# Patient Record
(demographics unavailable — no encounter records)

---

## 2024-10-10 NOTE — HISTORY OF PRESENT ILLNESS
[10] : 10 [Dull/Aching] : dull/aching [Sharp] : sharp [Tingling] : tingling [Household chores] : household chores [Social interactions] : social interactions [Meds] : meds [Ice] : ice [Heat] : heat [Nothing helps with pain getting better] : Nothing helps with pain getting better [Sitting] : sitting [Standing] : standing [Walking] : walking [Lying in bed] : lying in bed [de-identified] : 2/7/24: 53yo F with longstanding bilateral knee pain for the past 12 years with no injury. She states pain has been worsening over the past couple of weeks. She has done PT, NSAIDs, CSIs, and gel inj in the past. She went to the ER recently and had bilateral knees aspirated.   4/5/24: Here for follow up bilateral knee. Her pain is the same as last visit. She received authorization for MRIs today. There was no recent injury.   04/24/24: pt is here today for MRI results for diego knees. would like to discuss TKA today 05/29/24 - pt is here for a follow up on Diego knee. pt states she still has pain within the knees.  6/26/24: here for PO#1 bilateral knees. pt states she sill has pain and has been having throbbing within the areas, felt a pop in her right knee the other day  07/17/24 - pt is here for PO#2 bilaterial knees, pt states pain continues no changes. still has throbbing / traveling down left leg, toes. numbness present in toes.   7/31/24: pt is here today for f/u on b/l knees, pt states she still is having pain mostly at night  08/21/24: pt is here for f/u on b/l knees, stil has pain, mostly at night. she travelled to [place] for a week and her pain was calming down, she is improving overall  10/09/24: Patient is here to follow up on BOTH KNEES. Patient notes no improvement. patient has severe pain in the right knee with motion. Patient uses prescribed meds and rest to help with the pain. Pain is affecting sleep. Pt does at home PT due to pain in the back. [] : no [FreeTextEntry5] : 04/05/24 - pt is here today for EMA knees but RT knee hurts more, laying down is painful. and states medication did not help, also states she never got to do her MRI and would like a new referral  [FreeTextEntry7] : down the leg, tingling within toes for both.

## 2024-10-10 NOTE — PHYSICAL EXAM
[NL (0)] : extension 0 degrees [] : uses walker [Bilateral] : knee bilaterally [There are no fractures, subluxations or dislocations. No significant abnormalities are seen] : There are no fractures, subluxations or dislocations. No significant abnormalities are seen [No loss of surgical correlation. Bony alignment acceptable. Hardware in appropriate position] : No loss of surgical correlation. Bony alignment acceptable. Hardware in appropriate position [FreeTextEntry9] : flexion 110 on right, 100 on left

## 2024-10-10 NOTE — HISTORY OF PRESENT ILLNESS
[10] : 10 [Dull/Aching] : dull/aching [Sharp] : sharp [Tingling] : tingling [Household chores] : household chores [Social interactions] : social interactions [Meds] : meds [Ice] : ice [Heat] : heat [Nothing helps with pain getting better] : Nothing helps with pain getting better [Sitting] : sitting [Standing] : standing [Walking] : walking [Lying in bed] : lying in bed [de-identified] : 2/7/24: 53yo F with longstanding bilateral knee pain for the past 12 years with no injury. She states pain has been worsening over the past couple of weeks. She has done PT, NSAIDs, CSIs, and gel inj in the past. She went to the ER recently and had bilateral knees aspirated.   4/5/24: Here for follow up bilateral knee. Her pain is the same as last visit. She received authorization for MRIs today. There was no recent injury.   04/24/24: pt is here today for MRI results for diego knees. would like to discuss TKA today 05/29/24 - pt is here for a follow up on Diego knee. pt states she still has pain within the knees.  6/26/24: here for PO#1 bilateral knees. pt states she sill has pain and has been having throbbing within the areas, felt a pop in her right knee the other day  07/17/24 - pt is here for PO#2 bilaterial knees, pt states pain continues no changes. still has throbbing / traveling down left leg, toes. numbness present in toes.   7/31/24: pt is here today for f/u on b/l knees, pt states she still is having pain mostly at night  08/21/24: pt is here for f/u on b/l knees, stil has pain, mostly at night. she travelled to [place] for a week and her pain was calming down, she is improving overall  10/09/24: Patient is here to follow up on BOTH KNEES. Patient notes no improvement. patient has severe pain in the right knee with motion. Patient uses prescribed meds and rest to help with the pain. Pain is affecting sleep. Pt does at home PT due to pain in the back. [] : no [FreeTextEntry5] : 04/05/24 - pt is here today for EMA knees but RT knee hurts more, laying down is painful. and states medication did not help, also states she never got to do her MRI and would like a new referral  [FreeTextEntry7] : down the leg, tingling within toes for both.

## 2024-10-10 NOTE — ASSESSMENT
[FreeTextEntry1] : 55F 4mo s/p mary TKA  Continue outpt PT progress activities as tolerated f/u MRI R knee f/u esr crp cbc return after labs/imaging   We discussed the patient's progress and they were reminded of their antibiotic prophylaxis. We discussed continued physical therapy and/or a home exercise program. Questions about their knee replacement and future follow up were answered and discussed.

## 2024-10-23 NOTE — HISTORY OF PRESENT ILLNESS
[10] : 10 [Dull/Aching] : dull/aching [Sharp] : sharp [Tingling] : tingling [Household chores] : household chores [Social interactions] : social interactions [Meds] : meds [Ice] : ice [Heat] : heat [Nothing helps with pain getting better] : Nothing helps with pain getting better [Sitting] : sitting [Standing] : standing [Walking] : walking [Lying in bed] : lying in bed [de-identified] : 2/7/24: 55yo F with longstanding bilateral knee pain for the past 12 years with no injury. She states pain has been worsening over the past couple of weeks. She has done PT, NSAIDs, CSIs, and gel inj in the past. She went to the ER recently and had bilateral knees aspirated.   4/5/24: Here for follow up bilateral knee. Her pain is the same as last visit. She received authorization for MRIs today. There was no recent injury.   04/24/24: pt is here today for MRI results for diego knees. would like to discuss TKA today 05/29/24 - pt is here for a follow up on Diego knee. pt states she still has pain within the knees.  6/26/24: here for PO#1 bilateral knees. pt states she sill has pain and has been having throbbing within the areas, felt a pop in her right knee the other day  07/17/24 - pt is here for PO#2 bilaterial knees, pt states pain continues no changes. still has throbbing / traveling down left leg, toes. numbness present in toes.   7/31/24: pt is here today for f/u on b/l knees, pt states she still is having pain mostly at night  08/21/24: pt is here for f/u on b/l knees, stil has pain, mostly at night. she travelled to [place] for a week and her pain was calming down, she is improving overall  10/09/24: Patient is here to follow up on BOTH KNEES. Patient notes no improvement. patient has severe pain in the right knee with motion. Patient uses prescribed meds and rest to help with the pain. Pain is affecting sleep. Pt does at home PT due to pain in the back.  10/16/2024: pt is here today for MRI results of R knee, pt states she is doing okay, still has slight pain within the knee. pt also states she was unsure of where to have labs completed. [] : no [FreeTextEntry5] : 04/05/24 - pt is here today for EMA knees but RT knee hurts more, laying down is painful. and states medication did not help, also states she never got to do her MRI and would like a new referral  [FreeTextEntry7] : down the leg, tingling within toes for both.

## 2024-10-23 NOTE — ASSESSMENT
[FreeTextEntry1] : 55F 4mo s/p mary TKA  Continue outpt PT progress activities as tolerated MRI R knee review, patellar tendonopathy, low concern for tear she has full extension and strength f/u esr crp cbc return will call with lab results   We discussed the patient's progress and they were reminded of their antibiotic prophylaxis. We discussed continued physical therapy and/or a home exercise program. Questions about their knee replacement and future follow up were answered and discussed.

## 2024-10-23 NOTE — HISTORY OF PRESENT ILLNESS
[10] : 10 [Dull/Aching] : dull/aching [Sharp] : sharp [Tingling] : tingling [Household chores] : household chores [Social interactions] : social interactions [Meds] : meds [Ice] : ice [Heat] : heat [Nothing helps with pain getting better] : Nothing helps with pain getting better [Sitting] : sitting [Standing] : standing [Walking] : walking [Lying in bed] : lying in bed [de-identified] : 2/7/24: 55yo F with longstanding bilateral knee pain for the past 12 years with no injury. She states pain has been worsening over the past couple of weeks. She has done PT, NSAIDs, CSIs, and gel inj in the past. She went to the ER recently and had bilateral knees aspirated.   4/5/24: Here for follow up bilateral knee. Her pain is the same as last visit. She received authorization for MRIs today. There was no recent injury.   04/24/24: pt is here today for MRI results for diego knees. would like to discuss TKA today 05/29/24 - pt is here for a follow up on Diego knee. pt states she still has pain within the knees.  6/26/24: here for PO#1 bilateral knees. pt states she sill has pain and has been having throbbing within the areas, felt a pop in her right knee the other day  07/17/24 - pt is here for PO#2 bilaterial knees, pt states pain continues no changes. still has throbbing / traveling down left leg, toes. numbness present in toes.   7/31/24: pt is here today for f/u on b/l knees, pt states she still is having pain mostly at night  08/21/24: pt is here for f/u on b/l knees, stil has pain, mostly at night. she travelled to [place] for a week and her pain was calming down, she is improving overall  10/09/24: Patient is here to follow up on BOTH KNEES. Patient notes no improvement. patient has severe pain in the right knee with motion. Patient uses prescribed meds and rest to help with the pain. Pain is affecting sleep. Pt does at home PT due to pain in the back.  10/16/2024: pt is here today for MRI results of R knee, pt states she is doing okay, still has slight pain within the knee. pt also states she was unsure of where to have labs completed. [] : no [FreeTextEntry5] : 04/05/24 - pt is here today for EMA knees but RT knee hurts more, laying down is painful. and states medication did not help, also states she never got to do her MRI and would like a new referral  [FreeTextEntry7] : down the leg, tingling within toes for both.

## 2024-10-23 NOTE — ASSESSMENT
[FreeTextEntry1] : 55F 4mo s/p mray TKA  Continue outpt PT progress activities as tolerated MRI R knee review, patellar tendonopathy, low concern for tear she has full extension and strength f/u esr crp cbc return will call with lab results   We discussed the patient's progress and they were reminded of their antibiotic prophylaxis. We discussed continued physical therapy and/or a home exercise program. Questions about their knee replacement and future follow up were answered and discussed.

## 2025-04-21 NOTE — HISTORY OF PRESENT ILLNESS
[de-identified] : 04/21/2025 COLLETTE she 56 year  is here today for evaluation of b/l knees pain for the past 12 years with no injury.  She has done PT, NSAIDs, CSIs, and gel inj in the past. . Patient had TKA surgery with dr. Rodriguez 06/10/0224. Patient reports some numbness, swelling, stiffness pain. Patient notes pain radiates to b/l legs. Patient is here today for a second opinion. Patient states pain is worse when standing or WB. Had labs for infection workup which were negative. Reports pain never improved after surgery.   Had MRI of the right knee - moderate effusion with extensor tendinopathy

## 2025-04-21 NOTE — ASSESSMENT
[FreeTextEntry1] : 56 year F with bilateral TKA Dr. Rodriguez In 2024 labs are negative. MRI of the right knee with mild effusion does not have issues with jewelry however does have hyper sensitivity issues. consider bone scan in the future MRI of the left knee will contact for allergist for metal sensitivity  Time Based billin minutes was spent with the patient today taking the patient's history, conducting a physical examination, reviewing imaging studies, and  detailed discussion regarding the diagnosis and treatment plan. 1

## 2025-04-21 NOTE — IMAGING
[de-identified] : POSTOP Bilateral KNEE EXAM Edema: mild well healing surgical incision without surrounding erythema/drainage  ROM 0-90 degrees of flexion Stable to varus/valgus stress Stable to Anterior/posterior drawer test  Neurovascular exam Motor function 5/5 distal lower extremity Sensation to light touch: intact Distal pulses: 2+  XR of the R knee today demonstrate TKA in place w/o signs of interval changes from postoperative XR   [id number] Xrays of the Right Knee were taken demonstrating TKA in place w/o signs of loosening MRI - Severe extensor mechanism tendinopathy with possible partial tearing of patella tendon

## 2025-05-06 NOTE — PLAN
[FreeTextEntry1] : 1. Depression - Has hx of depression -- states she has been dealing with depression since she was a teenager. States she has been hospitalized in the past. Last saw a psychiatrist 2011ish -- feels she had a bad experience. Denies thoughts of self harm or harming others. She currently coping with getting comfort from her dog which is her therapy dog. Has good social support in her father and friends.  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 Feels it causes her to choke on food at times, gag, cough, and can be difficult to breath when lying flat recommend ENT evaluation for removal -- referral provided - ER precaution for difficulty breathing provided  3. Memory loss - Also reports memory loss -- feels this has been ongoing for a couple years; more so worse with short term memory. Reports her mother has hx of dementia -- diagnosed at age 69. Denies head injuries or trauma.  - CBC, CMP, serum B12 & folate levels ordered - Neurology Evaluation recommended -- referral provided   4. Osteoarthritis  - s/p b/l TKA - Follows with orthopedics for b/l knee osteoarthritis

## 2025-05-06 NOTE — PHYSICAL EXAM
[No Acute Distress] : no acute distress [Well-Appearing] : well-appearing [Normal Sclera/Conjunctiva] : normal sclera/conjunctiva [EOMI] : extraocular movements intact [Normal Outer Ear/Nose] : the outer ears and nose were normal in appearance [Normal Nasal Mucosa] : the nasal mucosa was normal [No Respiratory Distress] : no respiratory distress  [No Accessory Muscle Use] : no accessory muscle use [Clear to Auscultation] : lungs were clear to auscultation bilaterally [Normal Rate] : normal rate  [Regular Rhythm] : with a regular rhythm [Normal S1, S2] : normal S1 and S2 [No Edema] : there was no peripheral edema [Soft] : abdomen soft [Non Tender] : non-tender [Non-distended] : non-distended [No Masses] : no abdominal mass palpated [No HSM] : no HSM [Normal Bowel Sounds] : normal bowel sounds [No CVA Tenderness] : no CVA  tenderness [No Spinal Tenderness] : no spinal tenderness [No Joint Swelling] : no joint swelling [Grossly Normal Strength/Tone] : grossly normal strength/tone [Coordination Grossly Intact] : coordination grossly intact [Normal Gait] : normal gait [Normal Affect] : the affect was normal [Normal Insight/Judgement] : insight and judgment were intact [de-identified] : +mass of hard palate

## 2025-05-06 NOTE — PLAN
[FreeTextEntry1] : 1. Depression - Has hx of depression -- states she has been dealing with depression since she was a teenager. States she has been hospitalized in the past. Last saw a psychiatrist 2011ish -- feels she had a bad experience. Denies thoughts of self harm or harming others. She currently coping with getting comfort from her dog which is her therapy dog. Has good social support in her father and friends.  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 Feels it causes her to choke on food at times, gag, cough, and can be difficult to breath when lying flat recommend ENT evaluation for removal -- referral provided - ER precaution for difficulty breathing provided  3. Memory loss - Also reports memory loss -- feels this has been ongoing for a couple years; more so worse with short term memory. Reports her mother has hx of dementia -- diagnosed at age 69. Denies head injuries or trauma.  - CBC, CMP, serum B12 & folate levels ordered - Neurology Evaluation recommended -- referral provided   4. Osteoarthritis  - s/p b/l TKA - Follows with orthopedics for b/l knee osteoarthritis 20-Feb-2018 07:28

## 2025-05-06 NOTE — HISTORY OF PRESENT ILLNESS
[FreeTextEntry8] : The patient presents today to establish care for multiple complaints.  - Reports upper Throat lump for years -- feels it has been slowly growing. Feels it causes her to choke on food at times, gag, cough, and can be difficult to breath when lying flat.  - Also reports memory loss -- feels this has been ongoing for a couple years; more so worse with short term memory. Reports her mother has hx of dementia -- diagnosed at age 69. Denies head injuries or trauma.  - Has hx of depression -- states she has been dealing with depression since she was a teenager. States she has been hospitalized in the past. Last saw a psychiatrist 2011ish -- feels she had a bad experience. Denies thoughts of self harm or harming others. She currently coping with getting comfort from her dog which is her therapy dog. Has good social support in her father and friends.   Follows with orthopedics for b/l knee osteoarthritis

## 2025-05-06 NOTE — PHYSICAL EXAM
[No Acute Distress] : no acute distress [Well-Appearing] : well-appearing [Normal Sclera/Conjunctiva] : normal sclera/conjunctiva [EOMI] : extraocular movements intact [Normal Outer Ear/Nose] : the outer ears and nose were normal in appearance [Normal Nasal Mucosa] : the nasal mucosa was normal [No Respiratory Distress] : no respiratory distress  [No Accessory Muscle Use] : no accessory muscle use [Clear to Auscultation] : lungs were clear to auscultation bilaterally [Normal Rate] : normal rate  [Regular Rhythm] : with a regular rhythm [Normal S1, S2] : normal S1 and S2 [No Edema] : there was no peripheral edema [Soft] : abdomen soft [Non Tender] : non-tender [Non-distended] : non-distended [No Masses] : no abdominal mass palpated [No HSM] : no HSM [Normal Bowel Sounds] : normal bowel sounds [No CVA Tenderness] : no CVA  tenderness [No Spinal Tenderness] : no spinal tenderness [No Joint Swelling] : no joint swelling [Grossly Normal Strength/Tone] : grossly normal strength/tone [Coordination Grossly Intact] : coordination grossly intact [Normal Gait] : normal gait [Normal Affect] : the affect was normal [Normal Insight/Judgement] : insight and judgment were intact [de-identified] : +mass of hard palate

## 2025-05-13 NOTE — DATA REVIEWED
[de-identified] : [facility] 4/2024: CT HEAD: No acute hemorrhage, edema, or mass effect. CTA NECK: No evidence of hemodynamically significant stenosis using NASCET criteria. No evidence of arterial dissection. CTA HEAD: No evidence of major vessel occlusion.

## 2025-05-13 NOTE — ASSESSMENT
[FreeTextEntry1] : Assessment: 55yo RH AAW with chronic depression, here for 2y or so of forgetfulness, not affecting her ADL and IADL. Exam benign. CTh 4/2024 unrevealing. Motor ok, save for MSK pain in both knees. Concern for a few months of bladder hyperactivity.  Diagnostic Impression: -forgetfulness -depression -urinary urgency  Plan: Rule out reversible and vascular causes: -B vitamins, TFT, RPR -NPT-BRIEF -MRI brain w/o jude -MRI LS spine -basic inflammatory labs -Lyme serology.

## 2025-05-13 NOTE — HISTORY OF PRESENT ILLNESS
[FreeTextEntry1] : COVID, , mild, no tx. COVID VACCINE FULL.  PMH: 55yo RH AAW, with chronic depression, here for concerns of memory loss.   HPI: for a few years, she has noticed STM issues, with some progression.   -Memory: recent events, conversations, names - worse than usual -Language: WFD -Visuo-spatial/Orientation: feels sense of direction is getting worse -Praxis: ok -Executive fx/Decision making/Attention/Multitasking: ok   -Sleep: prior insomnia, recently sleeping very well; usually tired in the day; snores   -Appetite: good, increased, recently increased weight   -Motor symptoms: TKR bilat 2024 - more sedentary since then; significant pain in both knees   -B/B: bladder urgency with low output - for c.a. 2 years; some stress incontinence   -Psychiatric symptoms: chronic depression, recently started on Wellbutrin   -Functional status/Living Situation: live alone  -Functional Activities Questionnaire:  Dependent = 3  Requires assistance = 2  Has difficulty but does by self = 1  Normal = 0  Never did [the activity] but could do now = 0  Never did and would have difficulty now = 1  1. Writing checks, paying bills, balancing checkbook 0 2. Assembling tax records, business affairs, or papers 0 3. Shopping alone for clothes, household necessities, or groceries 0 4. Playing a game of skill, working on a hobby 0 5. Heating water, making a cup of coffee, turning off stove after use 0 6. Preparing a balanced meal 0 7. Keeping track of current events 0  8. Paying attention to, understanding, discussing TV, book, magazine 0 9. Remembering appointments, family occasions, holidays, medications 0 10. Traveling out of neighborhood, driving, arranging to take buses 0 TOTAL SCORE:0   [place] Index of [place] in Activities of Daily Livin. Bathing/Showerin 2. Dressin 3. Toiletin 4. Transferrin 5. Continence:1 6: Feedin   TOTAL:6   [place] Instrumental Activities of Daily Living: A. Ability to Use Telephone:1 B. Shoppin C. Food Preparation:1 D. Housekeepin E. Laundry:1 F. Transportation:1 G. Responsibility for Own Medications:1 H: Ability to Handle Finances:1   TOTAL:8   CDR: NA.   -Professional status: Athlete, ,    PCP and other physicians: -PCP: [facility]   Workup done: [facility] 2024 - done for severe HA. CT HEAD: No acute hemorrhage, edema, or mass effect. CTA NECK: No evidence of hemodynamically significant stenosis using NASCET criteria. No evidence of arterial dissection. CTA HEAD: No evidence of major vessel occlusion.

## 2025-05-13 NOTE — PHYSICAL EXAM
[General Appearance - Alert] : alert [General Appearance - In No Acute Distress] : in no acute distress [Oriented To Time, Place, And Person] : oriented to person, place, and time [Impaired Insight] : insight and judgment were intact [Affect] : the affect was normal [Person] : oriented to person [Place] : oriented to place [Time] : oriented to time [Short Term Intact] : short term memory intact [Remote Intact] : remote memory intact [Registration Intact] : recent registration memory intact [Concentration Intact] : normal concentrating ability [Visual Intact] : visual attention was ~T not ~L decreased [Naming Objects] : no difficulty naming common objects [Repeating Phrases] : no difficulty repeating a phrase [Writing A Sentence] : no difficulty writing a sentence [Fluency] : fluency intact [Comprehension] : comprehension intact [Reading] : reading intact [Current Events] : adequate knowledge of current events [Past History] : adequate knowledge of personal past history [Vocabulary] : adequate range of vocabulary [Total Score ___ / 30] : the patient achieved a score of [unfilled] /30 [Date / Time ___ / 5] : date / time [unfilled] / 5 [Place ___ / 5] : place [unfilled] / 5 [Registration ___ / 3] : registration [unfilled] / 3 [Serial Sevens ___/5] : serial sevens [unfilled] / 5 [Naming 2 Objects ___ / 2] : naming two objects [unfilled] / 2 [Repeating a Sentence ___ / 1] : repeating a sentence [unfilled] / 1 [Writing a Sentence ___ / 1] : write sentence [unfilled] / 1 [3-stage Verbal Command ___ / 3] : three-stage verbal command [unfilled] / 3 [Written Command ___ / 1] : written command [unfilled] / 1 [Copy a Design ___ / 1] : copy a design [unfilled] / 1 [Recall ___ / 3] : recall [unfilled] / 3 [Cranial Nerves Optic (II)] : visual acuity intact bilaterally,  visual fields full to confrontation, pupils equal round and reactive to light [Cranial Nerves Oculomotor (III)] : extraocular motion intact [Cranial Nerves Trigeminal (V)] : facial sensation intact symmetrically [Cranial Nerves Facial (VII)] : face symmetrical [Cranial Nerves Vestibulocochlear (VIII)] : hearing was intact bilaterally [Cranial Nerves Glossopharyngeal (IX)] : tongue and palate midline [Cranial Nerves Accessory (XI - Cranial And Spinal)] : head turning and shoulder shrug symmetric [Cranial Nerves Hypoglossal (XII)] : there was no tongue deviation with protrusion [Motor Tone] : muscle tone was normal in all four extremities [Motor Strength] : muscle strength was normal in all four extremities [Involuntary Movements] : no involuntary movements were seen [No Muscle Atrophy] : normal bulk in all four extremities [Motor Handedness Right-Handed] : the patient is right hand dominant [Motor Strength Upper Extremities Bilaterally] : strength was normal in both upper extremities [Motor Strength Lower Extremities Bilaterally] : strength was normal in both lower extremities [Sensation Tactile Decrease] : light touch was intact [Romberg's Sign] : Romberg's sign was negtive [Balance] : balance was intact [Limited Balance] : balance was intact [Past-pointing] : there was no past-pointing [Tremor] : no tremor present [1+] : Ankle jerk left 1+ [Plantar Reflex Right Only] : normal on the right [Plantar Reflex Left Only] : normal on the left [FreeTextEntry4] : Mental Status Exam Presidents: 5/5 Alternating Pattern: ok Spiral: ok Clock: 3/3 Repetition: ok  R/L discrimination on self and examiner: ok Cross-line commands: ok Praxis: -Motor: ok -Dynamic/Luria: ok -Ideomotor/Imitation: ok  -Ideational/writing/closing-in: ok -Dressing: ok. [Sclera] : the sclera and conjunctiva were normal [PERRL With Normal Accommodation] : pupils were equal in size, round, reactive to light, with normal accommodation [Extraocular Movements] : extraocular movements were intact [No APD] : no afferent pupillary defect [No RITCHIE] : no internuclear ophthalmoplegia [Full Visual Field] : full visual field [Outer Ear] : the ears and nose were normal in appearance [Neck Appearance] : the appearance of the neck was normal [Edema] : there was no peripheral edema [Abnormal Walk] : normal gait [FreeTextEntry1] : mary knee pain [] : no rash

## 2025-05-15 NOTE — HISTORY OF PRESENT ILLNESS
[de-identified] : 04/21/2025 COLLETTE she 56 year  is here today for evaluation of b/l knees pain for the past 12 years with no injury.  She has done PT, NSAIDs, CSIs, and gel inj in the past. . Patient had TKA surgery with dr. Rodriguez 06/10/0224. Patient reports some numbness, swelling, stiffness pain. Patient notes pain radiates to b/l legs. Patient is here today for a second opinion. Patient states pain is worse when standing or WB. Had labs for infection workup which were negative. Reports pain never improved after surgery.   Had MRI of the right knee - moderate effusion with extensor tendinopathy  05/12/2025: patient is here to discuss MRI results. patient states since last visit

## 2025-05-15 NOTE — ASSESSMENT
[FreeTextEntry1] : 56 year F with bilateral TKA Dr. Rodriguez In 2024 labs are negative. MRI of the right knee with mild effusion does not have issues with jewelry however does have hyper sensitivity issues. consider bone scan in the future MRI of the left knee will contact for allergist for metal sensitivity  Time Based billin minutes was spent with the patient today taking the patient's history, conducting a physical examination, reviewing imaging studies, and  detailed discussion regarding the diagnosis and treatment plan.

## 2025-05-15 NOTE — IMAGING
[de-identified] : POSTOP Bilateral KNEE EXAM Edema: mild well healing surgical incision without surrounding erythema/drainage  ROM 0-90 degrees of flexion Stable to varus/valgus stress Stable to Anterior/posterior drawer test  Neurovascular exam Motor function 5/5 distal lower extremity Sensation to light touch: intact Distal pulses: 2+  XR of the R knee today demonstrate TKA in place w/o signs of interval changes from postoperative XR   [id number] Xrays of the Right Knee were taken demonstrating TKA in place w/o signs of loosening MRI - Severe extensor mechanism tendinopathy with possible partial tearing of patella tendon

## 2025-05-15 NOTE — HISTORY OF PRESENT ILLNESS
[de-identified] : 04/21/2025 COLLETTE she 56 year  is here today for evaluation of b/l knees pain for the past 12 years with no injury.  She has done PT, NSAIDs, CSIs, and gel inj in the past. . Patient had TKA surgery with dr. Rodriguez 06/10/0224. Patient reports some numbness, swelling, stiffness pain. Patient notes pain radiates to b/l legs. Patient is here today for a second opinion. Patient states pain is worse when standing or WB. Had labs for infection workup which were negative. Reports pain never improved after surgery.   Had MRI of the right knee - moderate effusion with extensor tendinopathy  05/12/2025: patient is here to discuss MRI results. patient states since last visit

## 2025-05-15 NOTE — IMAGING
[de-identified] : POSTOP Bilateral KNEE EXAM Edema: mild well healing surgical incision without surrounding erythema/drainage  ROM 0-90 degrees of flexion Stable to varus/valgus stress Stable to Anterior/posterior drawer test  Neurovascular exam Motor function 5/5 distal lower extremity Sensation to light touch: intact Distal pulses: 2+  XR of the R knee today demonstrate TKA in place w/o signs of interval changes from postoperative XR   [id number] Xrays of the Right Knee were taken demonstrating TKA in place w/o signs of loosening MRI - Severe extensor mechanism tendinopathy with possible partial tearing of patella tendon  Anesthesia Volume In Cc: 6

## 2025-05-19 NOTE — ASSESSMENT
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  -Will obtain CT Maxillofacial to better delineate mass -Will refer to Dr. Sibley for surgical evaluation for removal  -Follow up as needed  -She is in agreement with this plan

## 2025-05-19 NOTE — PHYSICAL EXAM
[Midline] : trachea located in midline position [Normal] : no rashes [de-identified] : Posterior midline Torus Plantinus

## 2025-05-19 NOTE — HISTORY OF PRESENT ILLNESS
[de-identified] : 56 year old female referred by Dr. 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  Pt reports upper Throat lump for years -- feels it has been slowly growing. Feels it causes her to Cough and choke on food at times, gag and orthopnea at times. Pt denies dysphonia, dysphagia, dyspnea, pain, recent fevers or unintentional weight loss.  Smoked for 35 years less than 1/2 pack a day Quit in 2011 and reports social ETOH use

## 2025-05-20 NOTE — ASSESSMENT
[FreeTextEntry1] : Bilateral knee replacement with persistent pain and swelling - desire of orthopedist to evaluate for possible metal allergy -   Metal patch testing placed RV Thursday

## 2025-05-20 NOTE — PHYSICAL EXAM
[Alert] : alert [Well Nourished] : well nourished [No Acute Distress] : no acute distress [Well Developed] : well developed [No Neck Mass] : no neck mass was observed [No LAD] : no lymphadenopathy [Normal Rate and Effort] : normal respiratory rhythm and effort [No Crackles] : no crackles [No Retractions] : no retractions [Wheezing] : no wheezing was heard [Normal Rate] : heart rate was normal  [Normal S1, S2] : normal S1 and S2 [Regular Rhythm] : with a regular rhythm [Skin Intact] : skin intact  [Normal Mood] : mood was normal [Normal Affect] : affect was normal [Judgment and Insight Age Appropriate] : judgement and insight is age appropriate

## 2025-05-20 NOTE — HISTORY OF PRESENT ILLNESS
[de-identified] : Patient had bilateral knee replacement 6/2024 - since the surgery she has had pain - swelling - fluid accumulation in both knees - no associated rash but some localized itching on the skin.   She has had MRI performed of her knees - partial tendon tear in one knee - recent MRI done on her left knee - results are pending.   Orthopedist desires to rule out metal allergy.   Patient reports ear itching with costume jewelry.    She wears 14 K gold in her ears with no reactions.   Gold necklaces with no reaction.

## 2025-05-20 NOTE — REVIEW OF SYSTEMS
[Nl] : Genitourinary [FreeTextEntry4] : she has a lesion on the roof of her mouth being looked at presently.

## 2025-05-22 NOTE — ASSESSMENT
[FreeTextEntry1] : Possible metal contact dermatitis  RV 1 week metal patch interpretation   This visit was for 15 minutes with 80% of the time devoted to face-to-face counseling and coordination of care and 20% of the time devoted to review of medical records/lab results/ordering labs and other tests/speaking to patient and family members/writing the note.

## 2025-05-27 NOTE — ASSESSMENT
[FreeTextEntry1] : All metal patch tests are negative - patient will return to orthopedics for further evaluation of her joint pain.   This visit was for 20 minutes with 80% of the time devoted to face-to-face counseling and coordination of care and 20% of the time devoted to review of medical records/lab results/ordering labs and other tests/speaking to patient and family members/writing the note.

## 2025-06-02 NOTE — ASSESSMENT
[FreeTextEntry1] : 56 year F with bilateral TKA Dr. Rodriguez In 2024 labs are negative. MRI of the right knee with mild effusion does not have issues with jewelry however does have hyper sensitivity issues. consider bone scan in the future MRI of the left knee will contact for allergist for metal sensitivity  2025  Allergy testing was negative MRI Left knee w/o signs of loosening. Mild Patellar tilt bone scan to r/o loosening can consider aspiration of the knee in the future  Time Based billin minutes was spent with the patient today taking the patient's history, conducting a physical examination, reviewing imaging studies, and  detailed discussion regarding the diagnosis and treatment plan.

## 2025-06-02 NOTE — HISTORY OF PRESENT ILLNESS
[de-identified] : 04/21/2025 COLLETTE she 56 year  is here today for evaluation of b/l knees pain for the past 12 years with no injury.  She has done PT, NSAIDs, CSIs, and gel inj in the past. . Patient had TKA surgery with dr. Rodriguez 06/10/0224. Patient reports some numbness, swelling, stiffness pain. Patient notes pain radiates to b/l legs. Patient is here today for a second opinion. Patient states pain is worse when standing or WB. Had labs for infection workup which were negative. Reports pain never improved after surgery.   Had MRI of the right knee - moderate effusion with extensor tendinopathy  05/12/2025: patient is here to discuss R knee MRI results. patient states since last visit  6/2/25: Patient presents today to review MRI results of her left knee. Reports persisting pain & swelling in b/l knees.

## 2025-06-02 NOTE — IMAGING
[de-identified] : POSTOP Bilateral KNEE EXAM Edema: mild well healing surgical incision without surrounding erythema/drainage  ROM 0-90 degrees of flexion Stable to varus/valgus stress Stable to Anterior/posterior drawer test  Neurovascular exam Motor function 5/5 distal lower extremity Sensation to light touch: intact Distal pulses: 2+  XR of the R knee today demonstrate TKA in place w/o signs of interval changes from postoperative XR   [id number] Xrays of the Right Knee were taken demonstrating TKA in place w/o signs of loosening MRI - Severe extensor mechanism tendinopathy with possible partial tearing of patella tendon  mri  LEFT KNEE  - No component loosening, medial patellar tilt, mild effusion

## 2025-07-21 NOTE — HISTORY OF PRESENT ILLNESS
[de-identified] : 04/21/2025 COLLETTE she 56 year  is here today for evaluation of b/l knees pain for the past 12 years with no injury.  She has done PT, NSAIDs, CSIs, and gel inj in the past. . Patient had TKA surgery with dr. Rodriguez 06/10/0224. Patient reports some numbness, swelling, stiffness pain. Patient notes pain radiates to b/l legs. Patient is here today for a second opinion. Patient states pain is worse when standing or WB. Had labs for infection workup which were negative. Reports pain never improved after surgery.   Had MRI of the right knee - moderate effusion with extensor tendinopathy  05/12/2025: patient is here to discuss R knee MRI results. patient states since last visit  6/2/25: Patient presents today to review MRI results of her left knee. Reports persisting pain & swelling in b/l knees.  07/21/2025: patient is here today to follow up.

## 2025-07-21 NOTE — HISTORY OF PRESENT ILLNESS
[de-identified] : 04/21/2025 COLLETTE she 56 year  is here today for evaluation of b/l knees pain for the past 12 years with no injury.  She has done PT, NSAIDs, CSIs, and gel inj in the past. . Patient had TKA surgery with dr. Rodriguez 06/10/0224. Patient reports some numbness, swelling, stiffness pain. Patient notes pain radiates to b/l legs. Patient is here today for a second opinion. Patient states pain is worse when standing or WB. Had labs for infection workup which were negative. Reports pain never improved after surgery.   Had MRI of the right knee - moderate effusion with extensor tendinopathy  05/12/2025: patient is here to discuss R knee MRI results. patient states since last visit  6/2/25: Patient presents today to review MRI results of her left knee. Reports persisting pain & swelling in b/l knees.  07/21/2025: patient is here today to follow up.

## 2025-07-21 NOTE — IMAGING
[de-identified] : POSTOP Bilateral KNEE EXAM Edema: mild well healing surgical incision without surrounding erythema/drainage  ROM 0-90 degrees of flexion Stable to varus/valgus stress Stable to Anterior/posterior drawer test  Neurovascular exam Motor function 5/5 distal lower extremity Sensation to light touch: intact Distal pulses: 2+  XR of the R knee today demonstrate TKA in place w/o signs of interval changes from postoperative XR   [id number] Xrays of the Right Knee were taken demonstrating TKA in place w/o signs of loosening MRI - Severe extensor mechanism tendinopathy with possible partial tearing of patella tendon  mri  LEFT KNEE  - No component loosening, medial patellar tilt, mild effusion  Bone Scan: Negative three phase bone scan for an infectious process in the knees. Negative three phase bone scan for loosening. Mild uptake in the soft tissues along the right lateral and medial knee may be related to a soft tissue/tendinous injury. Clinical correlation as indicated.